# Patient Record
Sex: MALE | Race: OTHER | URBAN - METROPOLITAN AREA
[De-identification: names, ages, dates, MRNs, and addresses within clinical notes are randomized per-mention and may not be internally consistent; named-entity substitution may affect disease eponyms.]

---

## 2024-06-28 PROCEDURE — 99283 EMERGENCY DEPT VISIT LOW MDM: CPT

## 2024-06-28 PROCEDURE — 87636 SARSCOV2 & INF A&B AMP PRB: CPT

## 2024-06-28 RX ORDER — ACETAMINOPHEN 500 MG
1000 TABLET ORAL
Status: COMPLETED | OUTPATIENT
Start: 2024-06-28 | End: 2024-06-29

## 2024-06-28 ASSESSMENT — PAIN SCALES - GENERAL: PAINLEVEL_OUTOF10: 7

## 2024-06-28 ASSESSMENT — PAIN - FUNCTIONAL ASSESSMENT
PAIN_FUNCTIONAL_ASSESSMENT: PREVENTS OR INTERFERES SOME ACTIVE ACTIVITIES AND ADLS
PAIN_FUNCTIONAL_ASSESSMENT: 0-10

## 2024-06-28 ASSESSMENT — PAIN DESCRIPTION - PAIN TYPE: TYPE: ACUTE PAIN

## 2024-06-28 ASSESSMENT — PAIN DESCRIPTION - ONSET: ONSET: GRADUAL

## 2024-06-28 ASSESSMENT — PAIN DESCRIPTION - FREQUENCY: FREQUENCY: CONTINUOUS

## 2024-06-28 ASSESSMENT — PAIN DESCRIPTION - LOCATION: LOCATION: HEAD

## 2024-06-28 ASSESSMENT — PAIN DESCRIPTION - DESCRIPTORS: DESCRIPTORS: ACHING

## 2024-06-29 ENCOUNTER — HOSPITAL ENCOUNTER (EMERGENCY)
Facility: HOSPITAL | Age: 29
Discharge: HOME | End: 2024-06-29
Attending: EMERGENCY MEDICINE

## 2024-06-29 VITALS
BODY MASS INDEX: 44.78 KG/M2 | SYSTOLIC BLOOD PRESSURE: 136 MMHG | TEMPERATURE: 99.6 F | OXYGEN SATURATION: 97 % | WEIGHT: 268.74 LBS | HEART RATE: 110 BPM | HEIGHT: 65 IN | RESPIRATION RATE: 18 BRPM | DIASTOLIC BLOOD PRESSURE: 90 MMHG

## 2024-06-29 DIAGNOSIS — R50.9 ACUTE FEBRILE ILLNESS: ICD-10-CM

## 2024-06-29 DIAGNOSIS — B34.9 VIRAL SYNDROME: Primary | ICD-10-CM

## 2024-06-29 LAB
FLUAV RNA SPEC QL NAA+PROBE: NOT DETECTED
FLUBV RNA SPEC QL NAA+PROBE: NOT DETECTED
SARS-COV-2 RNA RESP QL NAA+PROBE: NOT DETECTED

## 2024-06-29 PROCEDURE — 6370000000 HC RX 637 (ALT 250 FOR IP): Performed by: EMERGENCY MEDICINE

## 2024-06-29 PROCEDURE — 6370000000 HC RX 637 (ALT 250 FOR IP)

## 2024-06-29 RX ORDER — IBUPROFEN 400 MG/1
600 TABLET ORAL
Status: COMPLETED | OUTPATIENT
Start: 2024-06-29 | End: 2024-06-29

## 2024-06-29 RX ADMIN — IBUPROFEN 600 MG: 400 TABLET, FILM COATED ORAL at 00:57

## 2024-06-29 RX ADMIN — ACETAMINOPHEN 1000 MG: 500 TABLET ORAL at 00:57

## 2024-06-29 NOTE — ED TRIAGE NOTES
Patient arrives ambulatory from home w/ CC fever x2-3 days with generalized body aches and headaches. Did not take any medications PTA.

## 2024-06-29 NOTE — ED PROVIDER NOTES
[facility] EMERGENCY DEP  EMERGENCY DEPARTMENT ENCOUNTER      Pt Name: Tevin Walker  MRN: [id number]  Birthdate 1995  Date of evaluation: 6/28/2024  Provider: Yamileth Lemus PA-C    CHIEF COMPLAINT       Chief Complaint   Patient presents with    Fever         HISTORY OF PRESENT ILLNESS   (Location/Symptom, Timing/Onset, Context/Setting, Quality, Duration, Modifying Factors, Severity)  Note limiting factors.   Tevin Walker is a 28 y.o. male with no significant past medical history who presents from home to [facility] ED with cc of fever x 2 days. No medication today for fever. Reports body aches, headache. Denies cough, congestion, sore throat. No known sick contacts. Denies nausea/ vomiting.  Reports he has been hydrating well.  Denies abdominal pain, urinary symptoms.      PCP: No primary care provider on file.    There are no other complaints, changes or physical findings at this time.        The history is provided by the patient. No  was used.         Review of External Medical Records:     Nursing Notes were reviewed.    REVIEW OF SYSTEMS    (2-9 systems for level 4, 10 or more for level 5)     Review of Systems   Constitutional:  Positive for fever.       Except as noted above the remainder of the review of systems was reviewed and negative.       PAST MEDICAL HISTORY   No past medical history on file.      SURGICAL HISTORY     No past surgical history on file.      CURRENT MEDICATIONS       Previous Medications    No medications on file       ALLERGIES     Wheat    FAMILY HISTORY     No family history on file.       SOCIAL HISTORY       Social History     Socioeconomic History    Marital status:            PHYSICAL EXAM    (up to 7 for level 4, 8 or more for level 5)     ED Triage Vitals [06/28/24 2341]   BP Temp Temp Source Pulse Respirations SpO2 Height Weight - Scale   (!) 185/148 (!) 102.1 °F (38.9 °C) Oral (!) 119 18 97 % 1.651 m (5' 5\") 121.9 kg (268 lb 11.9 oz)

## 2024-06-29 NOTE — DISCHARGE INSTRUCTIONS
You were seen in the emergency department for body aches and fever. Your symptoms appear to be due to a viral illness. Viral illnesses are treated with supportive care, including your fluid intake, over the counter fever and pain reducers such as ibuprofen or tylenol, and rest. Take all other medications as prescribed and directed.     Your condition should improve over the next 5 days with the care discussed. You should return to the ER- if you experience increased fevers that do not improve with use of Tylenol and Motrin (as directed),  Inability to tolerate oral intake, increased shortness of breath, neck stiffness, confusion, or other worsening or worrisome concerns. You should follow up with your primary care provider this week for further evaluation.

## 2024-10-12 ENCOUNTER — HOSPITAL ENCOUNTER (EMERGENCY)
Facility: HOSPITAL | Age: 29
Discharge: HOME | End: 2024-10-12
Attending: STUDENT IN AN ORGANIZED HEALTH CARE EDUCATION/TRAINING PROGRAM

## 2024-10-12 VITALS
TEMPERATURE: 98.7 F | BODY MASS INDEX: 43.09 KG/M2 | RESPIRATION RATE: 17 BRPM | OXYGEN SATURATION: 97 % | WEIGHT: 258.6 LBS | DIASTOLIC BLOOD PRESSURE: 108 MMHG | HEIGHT: 65 IN | HEART RATE: 101 BPM | SYSTOLIC BLOOD PRESSURE: 148 MMHG

## 2024-10-12 DIAGNOSIS — G56.01 CARPAL TUNNEL SYNDROME OF RIGHT WRIST: Primary | ICD-10-CM

## 2024-10-12 DIAGNOSIS — I10 ESSENTIAL HYPERTENSION: ICD-10-CM

## 2024-10-12 PROCEDURE — 99283 EMERGENCY DEPT VISIT LOW MDM: CPT

## 2024-10-12 RX ORDER — DICLOFENAC SODIUM 75 MG/1
75 TABLET, DELAYED RELEASE ORAL 2 TIMES DAILY
Qty: 28 TABLET | Refills: 0 | Status: SHIPPED | OUTPATIENT
Start: 2024-10-12

## 2024-10-12 RX ORDER — AMLODIPINE BESYLATE 5 MG/1
5 TABLET ORAL DAILY
Qty: 30 TABLET | Refills: 0 | Status: SHIPPED | OUTPATIENT
Start: 2024-10-12

## 2024-10-12 ASSESSMENT — PAIN SCALES - GENERAL: PAINLEVEL_OUTOF10: 3

## 2024-10-12 ASSESSMENT — PAIN DESCRIPTION - LOCATION: LOCATION: HAND

## 2024-10-12 ASSESSMENT — PAIN DESCRIPTION - DESCRIPTORS: DESCRIPTORS: NUMBNESS

## 2024-10-12 ASSESSMENT — PAIN - FUNCTIONAL ASSESSMENT
PAIN_FUNCTIONAL_ASSESSMENT: ACTIVITIES ARE NOT PREVENTED
PAIN_FUNCTIONAL_ASSESSMENT: NONE - DENIES PAIN

## 2024-10-12 ASSESSMENT — PAIN DESCRIPTION - ONSET: ONSET: ON-GOING

## 2024-10-12 ASSESSMENT — PAIN DESCRIPTION - ORIENTATION: ORIENTATION: RIGHT

## 2024-10-12 ASSESSMENT — PAIN DESCRIPTION - FREQUENCY: FREQUENCY: INTERMITTENT

## 2024-10-12 ASSESSMENT — PAIN DESCRIPTION - PAIN TYPE: TYPE: NEUROPATHIC PAIN

## 2024-10-12 NOTE — ED PROVIDER NOTES
[facility] EMERGENCY DEP  EMERGENCY DEPARTMENT ENCOUNTER      Pt Name: Tevin Walker  MRN: [id number]  Birthdate 1995  Date of evaluation: 10/12/2024  Provider: Tiffany Novak PA-C    CHIEF COMPLAINT       Chief Complaint   Patient presents with    Numbness         HISTORY OF PRESENT ILLNESS   (Location/Symptom, Timing/Onset, Context/Setting, Quality, Duration, Modifying Factors, Severity)  Note limiting factors.   HPI      Review of External Medical Records:     Nursing Notes were reviewed.    REVIEW OF SYSTEMS    (2-9 systems for level 4, 10 or more for level 5)     Review of Systems    Except as noted above the remainder of the review of systems was reviewed and negative.       PAST MEDICAL HISTORY   No past medical history on file.      SURGICAL HISTORY     No past surgical history on file.      CURRENT MEDICATIONS       Previous Medications    No medications on file       ALLERGIES     Wheat    FAMILY HISTORY     No family history on file.       SOCIAL HISTORY       Social History     Socioeconomic History    Marital status:            PHYSICAL EXAM    (up to 7 for level 4, 8 or more for level 5)     ED Triage Vitals [10/12/24 1555]   BP Systolic BP Percentile Diastolic BP Percentile Temp Temp Source Pulse Respirations SpO2   (!) 176/124 -- -- 98.7 °F (37.1 °C) Oral (!) 111 17 100 %      Height Weight - Scale         1.651 m (5' 5\") 117.3 kg (258 lb 9.6 oz)             Body mass index is 43.03 kg/m².    Physical Exam    DIAGNOSTIC RESULTS     EKG: All EKG's are interpreted by the Emergency Department Physician who either signs or Co-signs this chart in the absence of a cardiologist.        RADIOLOGY:   Non-plain film images such as CT, Ultrasound and MRI are read by the radiologist. Plain radiographic images are visualized and preliminarily interpreted by the emergency physician with the below findings:        Interpretation per the Radiologist below, if available at the time of this note:    No orders  department with concern for numbness in the right hand, generally thumb, index and middle finger.  This has been present for approximately 1 week, and has progressively worsened.  The patient has full range of motion of the digits.  He denies any shoulder or neck pain or injury.  He denies any repetitive motion type work recently.    The patient also has hypertension, [place], and does not have a provider.  He was previously taking amlodipine 5 mg daily, but stopped taking it, because he did not feel bad.  He is not having any symptoms of hypertension, denies any headache, weakness or numbness.  He denies any strokelike symptoms.    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 He has done this previously, but not to the same extent.   He has not had any neck or shoulder issues previously, and has not been in any MVC's either, in the past. As Phalen's test is positive and he has a history of repetitive movement to the right wrist just prior to the onset of symptoms, this is likely the cause, strain to the wrist causing carpal tunnel. He is splinted and advised NSAIDs.  He will follow up if not improving and was advised to follow with orthopedics.    As his blood pressure has remained elevated during his stay, and he does not have a primary care provider at this time, I have provided him with a list of low-cost or free options for primary care, as well as he was given a prescription for 30-day supply of amlodipine 5 mg daily.  He was counseled on the dangers of elevated blood pressure, causing end organ damage, and without

## 2024-10-12 NOTE — DISCHARGE INSTRUCTIONS
Wear the splint at all times initially, you may remove it for showering and hygiene.  Take diclofenac with food.  Take the blood pressure medicine as directed, in the interim, you should find a primary care provider that will be able to manage her care.  Schedule an appointment with orthopedics to follow-up with carpal tunnel, particularly, you may see a hand specialist      [facility] Health Departments     For adult and child immunizations, family planning, TB screening, STD testing and women's health services.   [facility]: [facility] [phone]      [facility] [phone]    [facility]   [phone]    [facility]   [phone]    [place]   [phone]    [facility] [place] [phone]      [place] [phone]      [place] [phone]          General Health Clinics     For primary care services, woman and child wellness, and some clinics providing specialty care.   [facility] [address] [phone]   [facility] [address] [phone]   [facility] [address] [phone]   [facility] [address] [phone]   [facility] [address] [phone]   [facility] [address] [phone]   [place] [facility] [address] [phone]   [facility] [address] [facility] [address] [phone], [phone]     [place] [address] [phone]     [place] [address] [facility] [phone]   [facility] [address] [phone]   [facility]  [address] [phone]   [facility] ([email or web address]) [phone]         Sexual Health/Woman Wellness Clinics    For STD/HIV testing and treatment, pregnancy testing and services, men's health, birth control services, LGBT services, and hepatitis/HPV vaccine services.   [facility]

## 2024-10-12 NOTE — ED TRIAGE NOTES
Pt ambulatory to ED w/ c/o r. Hand numbness in thumb, pointer and middle finger x1 wk. Pt denies numbness extending up arm, denies pain in shoulder or neck. Denies trauma or injury. Pt c/o swelling in hand several days ago.    normal